# Patient Record
Sex: MALE | Race: WHITE | NOT HISPANIC OR LATINO | Employment: UNEMPLOYED | URBAN - METROPOLITAN AREA
[De-identification: names, ages, dates, MRNs, and addresses within clinical notes are randomized per-mention and may not be internally consistent; named-entity substitution may affect disease eponyms.]

---

## 2019-05-21 ENCOUNTER — HOSPITAL ENCOUNTER (EMERGENCY)
Facility: HOSPITAL | Age: 24
Discharge: HOME | End: 2019-05-21
Attending: FAMILY MEDICINE

## 2019-05-21 VITALS
WEIGHT: 315 LBS | BODY MASS INDEX: 41.75 KG/M2 | TEMPERATURE: 98 F | RESPIRATION RATE: 20 BRPM | DIASTOLIC BLOOD PRESSURE: 58 MMHG | SYSTOLIC BLOOD PRESSURE: 126 MMHG | OXYGEN SATURATION: 99 % | HEIGHT: 73 IN | HEART RATE: 64 BPM

## 2019-05-21 DIAGNOSIS — R20.2 ARM PARESTHESIA, LEFT: ICD-10-CM

## 2019-05-21 DIAGNOSIS — M62.838 MUSCLE SPASM: Primary | ICD-10-CM

## 2019-05-21 DIAGNOSIS — R20.0 NUMBNESS: ICD-10-CM

## 2019-05-21 PROCEDURE — 86703 HIV-1/HIV-2 1 RESULT ANTBDY: CPT

## 2019-05-21 PROCEDURE — 99285 EMERGENCY DEPT VISIT HI MDM: CPT

## 2019-05-21 PROCEDURE — 36415 COLL VENOUS BLD VENIPUNCTURE: CPT

## 2019-05-21 RX ORDER — CYCLOBENZAPRINE HCL 10 MG
10 TABLET ORAL 3 TIMES DAILY PRN
Qty: 15 TABLET | Refills: 0 | Status: SHIPPED | OUTPATIENT
Start: 2019-05-21 | End: 2019-05-24

## 2019-05-21 NOTE — DISCHARGE INSTRUCTIONS
Is have a follow-up MRI done outpatient primary.  If symptoms worsen or it started chest pain shortness of breath occur please come back to the emergency department.  At this time I do not think that this is cardiac related but more of a due to muscle spasm and strain.

## 2019-05-21 NOTE — ED PROVIDER NOTES
SCRIBE #1 NOTE: I, Katrina Moreno, am scribing for, and in the presence of, Bailey Mead MD. I have scribed the entire note.      History      Chief Complaint   Patient presents with    Numbness     numbness and tingling to left jaw radiating down left arm since Saturday       Review of patient's allergies indicates:  No Known Allergies     HPI   HPI    5/21/2019, 4:24 PM   History obtained from the patient, mother      History of Present Illness: Hussain Ge is a 23 y.o. male patient who presents to the Emergency Department for LUE tingling which onset gradually 4 days ago. Symptoms are intermittent and moderate in severity. No mitigating or exacerbating factors reported. Associated sxs include L facial tingling and upper back pain. Mother reports that patient has been sleeping on an air mattress for 2 months. Patient denies any facial asymmetry, speech difficulty, gait problem, CP, SOB, diaphoresis, palpitations, extremity weakness, leg pain/swelling, dizziness, cough, n/v/d, fever, chills, dysuria, hematuria, and all other sxs at this time. No further complaints or concerns at this time.         Arrival mode: Personal vehicle      PCP: Primary Doctor No       Past Medical History:  History reviewed. No pertinent past medical history.    Past Surgical History:  History reviewed. No pertinent surgical history.      Family History:  History reviewed. No pertinent family history.    Social History:  Social History     Tobacco Use    Smoking status: Current Every Day Smoker     Packs/day: 0.50     Types: Cigarettes    Smokeless tobacco: Never Used   Substance and Sexual Activity    Alcohol use: Never     Frequency: Never    Drug use: Never    Sexual activity: Unknown       ROS   Review of Systems   Constitutional: Negative for chills, diaphoresis and fever.   HENT: Negative for sore throat.    Respiratory: Negative for cough and shortness of breath.    Cardiovascular: Negative for chest pain,  palpitations and leg swelling.   Gastrointestinal: Negative for abdominal pain, diarrhea, nausea and vomiting.   Genitourinary: Negative for dysuria.   Musculoskeletal: Positive for back pain (upper). Negative for gait problem.   Skin: Negative for rash.   Neurological: Positive for numbness (LUE tingling, L facial tingling). Negative for dizziness, facial asymmetry, speech difficulty and weakness.   Hematological: Does not bruise/bleed easily.   All other systems reviewed and are negative.      Physical Exam      Initial Vitals [05/21/19 1544]   BP Pulse Resp Temp SpO2   122/76 87 20 98.6 °F (37 °C) 98 %      MAP       --          Physical Exam  Nursing Notes and Vital Signs Reviewed.  Constitutional: Patient is in no acute distress. Well-developed and well-nourished.  Head: Atraumatic. Normocephalic.  Eyes: PERRL. EOM intact. Conjunctivae are not pale. No scleral icterus.  ENT: Mucous membranes are moist. Oropharynx is clear and symmetric.    Neck: Supple. Full ROM. No lymphadenopathy. Bilateral trapezius and C7-C8 paraspinal soreness. No cervical midline bony tenderness, deformities, or step-offs.   Cardiovascular: Regular rate. Regular rhythm. No murmurs, rubs, or gallops. Distal pulses are 2+ and symmetric.  Pulmonary/Chest: No respiratory distress. Clear to auscultation bilaterally. No wheezing or rales.  Abdominal: Soft and non-distended.  There is no tenderness.  No rebound, guarding, or rigidity.   Musculoskeletal: Moves all extremities. No obvious deformities. No edema. No calf tenderness.  Back: No midline bony tenderness, deformities, or step-offs of the T-spine or L-spine. Skin appears normal without abrasions or bruising. No erythema, induration, or fluctuance.   Skin: Warm and dry.  Neurological: Patient is alert and oriented to person, place and time. Pupils ERRL and EOM normal. Cranial nerves II-XII are intact. Strength is full bilaterally; it is equal and 5/5 in bilateral upper and lower  "extremities. There is no pronator drift of outstretched arms. Light touch sense is intact. Speech is clear and normal. No acute focal neurological deficits noted.  Psychiatric: Normal affect. Good eye contact. Appropriate in content.    ED Course    Procedures  ED Vital Signs:  Vitals:    05/21/19 1544 05/21/19 1718   BP: 122/76 138/88   Pulse: 87 75   Resp: 20    Temp: 98.6 °F (37 °C)    TempSrc: Oral    SpO2: 98% 98%   Weight: (!) 242 kg (533 lb 8.2 oz)    Height: 6' 1" (1.854 m)        Abnormal Lab Results:  Labs Reviewed   HIV 1 / 2 ANTIBODY        All Lab Results:      Imaging Results:  Imaging Results          X-Ray Cervical Spine AP And Lateral (Final result)  Result time 05/21/19 16:56:10    Final result by MILDRED Mirza Sr., MD (05/21/19 16:56:10)                 Impression:      There is straightening of the normal cervical lordosis.      Electronically signed by: Nitish Mirza MD  Date:    05/21/2019  Time:    16:56             Narrative:    EXAMINATION:  XR CERVICAL SPINE AP LATERAL    CLINICAL HISTORY:  Paresthesia of skin    COMPARISON:  None    FINDINGS:  There is no fracture, spondylolisthesis, or scoliosis. There is straightening of the normal cervical lordosis. The prevertebral soft tissue space is normal in appearance.                               The EKG was ordered, reviewed, and independently interpreted by the ED provider.  Interpretation time: 1548  Rate: 93 BPM  Rhythm: Sinus rhythm with marked sinus arrhythmia  Interpretation: Septal infarct. No STEMI.           The Emergency Provider reviewed the vital signs and test results, which are outlined above.    ED Discussion     5:27 PM: Reassessed pt at this time.  Pt is awake, alert, and in NAD at this time. Discussed with pt all pertinent ED information and results. Discussed pt dx and plan of tx. Gave pt all f/u and return to the ED instructions. All questions and concerns were addressed at this time. Pt expresses understanding of " information and instructions, and is comfortable with plan to discharge. Pt is stable for discharge.    I discussed with patient and/or family/caretaker that negative X-ray does not rule out occult fracture or other soft tissue injury.  Persistent pain greater than 7-10 days or increased pain requires follow up, specifically with orthopedics.     I discussed with patient and/or family/caretaker that evaluation in the ED does not suggest any emergent or life threatening medical conditions requiring immediate intervention beyond what was provided in the ED, and I believe patient is safe for discharge.  Regardless, an unremarkable evaluation in the ED does not preclude the development or presence of a serious of life threatening condition. As such, patient was instructed to return immediately for any worsening or change in current symptoms.    Current Discharge Medication List      START taking these medications    Details   cyclobenzaprine (FLEXERIL) 10 MG tablet Take 1 tablet (10 mg total) by mouth 3 (three) times daily as needed for Muscle spasms.  Qty: 15 tablet, Refills: 0               ED Medication(s):  Medications - No data to display    Follow-up Information     [facility]. Schedule an appointment as soon as possible for a visit in 3 days.    Contact information:  [address] [facility] [address] [phone]                     Medical Decision Making    Medical Decision Making:   Clinical Tests:   Lab Tests: Ordered and Reviewed  Radiological Study: Ordered and Reviewed  Medical Tests: Ordered and Reviewed  ED Management:  At this time I do not have any clinical suspicion of cardiac etiology resulting this left arm paresthesias.  In fact I believe it is more of musculoskeletal origin of cervical region.  I have advised to take a have an MRI done outpatient as this has been chronic as well.  Patient and family verbalized understanding and is ready for discharge.           Scribe  Attestation:   Scribe #1: I performed the above scribed service and the documentation accurately describes the services I performed. I attest to the accuracy of the note.    Attending:   Physician Attestation Statement for Scribe #1: I, Bailey Mead MD, personally performed the services described in this documentation, as scribed by Katrina Moreno, in my presence, and it is both accurate and complete.          Clinical Impression       ICD-10-CM ICD-9-CM   1. Muscle spasm M62.838 728.85   2. Numbness R20.0 782.0   3. Arm paresthesia, left R20.2 782.0       Disposition:   Disposition: Discharged  Condition: Stable         Bailey Mead MD  05/21/19 7444

## 2019-05-22 LAB — HIV 1+2 AB+HIV1 P24 AG SERPL QL IA: NEGATIVE
